# Patient Record
Sex: FEMALE | Race: WHITE | NOT HISPANIC OR LATINO | URBAN - METROPOLITAN AREA
[De-identification: names, ages, dates, MRNs, and addresses within clinical notes are randomized per-mention and may not be internally consistent; named-entity substitution may affect disease eponyms.]

---

## 2024-10-22 ENCOUNTER — APPOINTMENT (OUTPATIENT)
Dept: PEDIATRICS | Facility: CLINIC | Age: 19
End: 2024-10-22

## 2024-11-04 ENCOUNTER — APPOINTMENT (OUTPATIENT)
Dept: PEDIATRICS | Facility: CLINIC | Age: 19
End: 2024-11-04

## 2024-11-20 PROBLEM — N91.1 SECONDARY AMENORRHEA: Status: ACTIVE | Noted: 2024-11-20

## 2024-11-20 PROBLEM — S83.512A LEFT ANTERIOR CRUCIATE LIGAMENT TEAR: Status: ACTIVE | Noted: 2023-09-22

## 2024-11-20 RX ORDER — NAPROXEN 500 MG/1
500 TABLET ORAL
COMMUNITY
Start: 2023-09-18 | End: 2024-11-22 | Stop reason: ALTCHOICE

## 2024-11-20 RX ORDER — OXYCODONE AND ACETAMINOPHEN 5; 325 MG/1; MG/1
1 TABLET ORAL EVERY 6 HOURS PRN
COMMUNITY
Start: 2023-09-18 | End: 2024-11-22 | Stop reason: ALTCHOICE

## 2024-11-22 ENCOUNTER — APPOINTMENT (OUTPATIENT)
Dept: PEDIATRICS | Facility: CLINIC | Age: 19
End: 2024-11-22
Payer: COMMERCIAL

## 2024-11-22 VITALS
WEIGHT: 141.56 LBS | SYSTOLIC BLOOD PRESSURE: 113 MMHG | HEART RATE: 72 BPM | HEIGHT: 68 IN | BODY MASS INDEX: 21.45 KG/M2 | DIASTOLIC BLOOD PRESSURE: 77 MMHG

## 2024-11-22 DIAGNOSIS — R53.82 CHRONIC FATIGUE: ICD-10-CM

## 2024-11-22 DIAGNOSIS — Z00.00 WELLNESS EXAMINATION: Primary | ICD-10-CM

## 2024-11-22 DIAGNOSIS — Z28.39 UNDERIMMUNIZED: ICD-10-CM

## 2024-11-22 DIAGNOSIS — N91.1 SECONDARY AMENORRHEA: ICD-10-CM

## 2024-11-22 LAB — PREGNANCY TEST URINE, POC: NEGATIVE

## 2024-11-22 PROCEDURE — 3008F BODY MASS INDEX DOCD: CPT | Performed by: PEDIATRICS

## 2024-11-22 PROCEDURE — 99395 PREV VISIT EST AGE 18-39: CPT | Performed by: PEDIATRICS

## 2024-11-22 PROCEDURE — 81025 URINE PREGNANCY TEST: CPT | Performed by: PEDIATRICS

## 2024-11-22 PROCEDURE — 1036F TOBACCO NON-USER: CPT | Performed by: PEDIATRICS

## 2024-11-22 PROCEDURE — 96127 BRIEF EMOTIONAL/BEHAV ASSMT: CPT | Performed by: PEDIATRICS

## 2024-11-22 NOTE — PROGRESS NOTES
Subjective   History was provided by the  Lucille .  Lucille He is a 18 y.o. female who is here for menstrual concerns and treatment options.    History:  Concerns with menses: irregular  Menarche: 11  LMP: 3/2024  Frequency of cycles: q 4 weeks to 8 months - current.  Went a year before as well  Duration of cycles: 1-2d  Cramps:no  Flow:light  Severity: moderate  Duration: chronic  Onset/Timing: recurrent episode  Context: not sexually active; no prior history of STDs  Modifying Factors: OTC medication  Associated Symptoms: No hirsutism or excessive acne.  Notes: No FH of breast cancer or strokes at a young age. Patient does not have complicated migraines.    Secondary amenorrhea questions: No galactorrhea, weight change, systemic illness. (Maybe some hot flashes)

## 2024-11-22 NOTE — PROGRESS NOTES
"Subjective   History was provided by Lucille.  Lucille He is a 18 y.o. female who is here for this well visit.      Current Issues:  Current concerns: irregular periods and fatigue  Vision or hearing concerns? no  Dental care up to date? Yes- brushes teeth 2 times/day, regular dental visits, does floss teeth   No significant recent health issues.   Specialist visits - ortho    Review of Nutrition, Elimination, and Sleep:  Current diet:  3 meals/day, diet well balanced, normal portions, <8oz. sugar containing beverages daily, adequate dairy intake, diet includes fruits and vegetables and protein.  Elimination: normal bowel movement frequency, normal consistency   Sleep: has structured bedtime routine, sleeps through the night, no trouble getting up.  Sleeps 7 1/2 hours per night    School and Social Screening:  [employer]: [employer]  Work:no  Supportive social network. Current relationship - none.     Sports Participation Screening:  Gets regular exercise. College volleyball    Screening Questions:  Other: normal mood, satisfied with body weight  Risk factors for dyslipidemia: no  Risk factors for sexually-transmitted infections:   Sexually active: no   Using condoms: N/A  Substance use:  Smoking - No  Vaping - No  Drinking - No  Drugs - No  Genitourinary:  see other note  Objective   /77   Pulse 72   Ht 1.727 m (5' 8\")   Wt 64.2 kg (141 lb 9 oz)   BMI 21.52 kg/m²   Growth parameters are noted and are appropriate for age.    Physical Exam  Vitals reviewed.   Constitutional:       General: She is not in acute distress.     Appearance: Normal appearance. She is normal weight.   HENT:      Head: Normocephalic.      Right Ear: Tympanic membrane, ear canal and external ear normal.      Left Ear: Tympanic membrane, ear canal and external ear normal.      Nose: Nose normal.      Mouth/Throat:      Mouth: Mucous membranes are moist.      Pharynx: Oropharynx is clear.   Eyes:      Extraocular Movements: " Extraocular movements intact.      Conjunctiva/sclera: Conjunctivae normal.      Pupils: Pupils are equal, round, and reactive to light.   Cardiovascular:      Rate and Rhythm: Normal rate and regular rhythm.      Pulses: Normal pulses.           Femoral pulses are 2+ on the right side and 2+ on the left side.     Heart sounds: Normal heart sounds. No murmur heard.  Pulmonary:      Effort: Pulmonary effort is normal.      Breath sounds: Normal breath sounds.   Chest:   Breasts:     Bora Score is 5.      Breasts are symmetrical.   Abdominal:      General: Abdomen is flat.      Palpations: Abdomen is soft. There is no hepatomegaly, splenomegaly or mass.   Genitourinary:     Comments: Pt declines exam  Musculoskeletal:         General: Normal range of motion.      Right shoulder: Normal.      Left shoulder: Normal.      Right upper arm: Normal.      Left upper arm: Normal.      Right elbow: Normal.      Left elbow: Normal.      Right forearm: Normal.      Left forearm: Normal.      Right wrist: Normal.      Left wrist: Normal.      Right hand: Normal.      Left hand: Normal.      Cervical back: Normal, normal range of motion and neck supple.      Thoracic back: Normal. No scoliosis.      Lumbar back: Normal. No scoliosis.      Right hip: Normal.      Left hip: Normal.      Right knee: Normal.      Left knee: Normal.      Right ankle: Normal.      Left ankle: Normal.      Right foot: Normal.      Left foot: Normal.      Comments: Normal duck walk; normal arch of foot   Lymphadenopathy:      Cervical: No cervical adenopathy.   Skin:     General: Skin is warm.      Findings: No acne or rash.      Comments: No significant acne   Neurological:      General: No focal deficit present.      Mental Status: She is alert and oriented to person, place, and time.      Motor: No weakness.      Gait: Gait normal.      Deep Tendon Reflexes:      Reflex Scores:       Patellar reflexes are 2+ on the right side and 2+ on the left  side.  Psychiatric:         Mood and Affect: Mood normal.         Behavior: Behavior normal.         Assessment/Plan   Lucille was seen today for well child.  Diagnoses and all orders for this visit:  Wellness examination (Primary)  Secondary amenorrhea  -     POCT pregnancy, urine manually resulted  Underimmunized  Chronic fatigue    Well young adult.  - Anticipatory guidance discussed.   - Injury prevention: wearing seatbelt, understanding sun protection, understanding conflict resolution/violence prevention,  reviewed driving safety    -Risk Taking: cardiac risk factors reviewed, alcohol, drug and tobacco use reviewed, reviewed internet safety      - Growth and weight gain appropriate. The patient was counseled regarding nutrition and physical activity.  - Development: appropriate for age  - Get in the habit of monthly breast exams.    - No Immunizations   - Follow up in 1 year for next well child exam or sooner with concerns.    Problem List Items Addressed This Visit       Secondary amenorrhea     Referred to Gyn due to being a recurrent problem.  Doesn't fit with PCOS or central process - all levels normal 2 years ago and it's a recurrent issue.  Decided to go with gyn and let them decide on progesterone for withdrawal bleed.          Relevant Orders    POCT pregnancy, urine manually resulted (Completed)    Underimmunized     Given info on vaccines she hasn't had.         Chronic fatigue     Start with getting 9 hours of sleep per night and see if that helps. If not, blood work can be repeated.            Other Visit Diagnoses       Wellness examination    -  Primary

## 2024-11-22 NOTE — ASSESSMENT & PLAN NOTE
Start with getting 9 hours of sleep per night and see if that helps. If not, blood work can be repeated.

## 2024-11-22 NOTE — ASSESSMENT & PLAN NOTE
Referred to Gyn due to being a recurrent problem.  Doesn't fit with PCOS or central process - all levels normal 2 years ago and it's a recurrent issue.  Decided to go with gyn and let them decide on progesterone for withdrawal bleed.

## 2024-12-20 ENCOUNTER — APPOINTMENT (OUTPATIENT)
Dept: PEDIATRICS | Facility: CLINIC | Age: 19
End: 2024-12-20